# Patient Record
Sex: MALE | Race: WHITE
[De-identification: names, ages, dates, MRNs, and addresses within clinical notes are randomized per-mention and may not be internally consistent; named-entity substitution may affect disease eponyms.]

---

## 2017-01-10 ENCOUNTER — HOSPITAL ENCOUNTER (OUTPATIENT)
Dept: HOSPITAL 61 - PCVCCLINIC | Age: 67
Discharge: HOME | End: 2017-01-10
Attending: INTERNAL MEDICINE
Payer: MEDICARE

## 2017-01-10 DIAGNOSIS — K21.9: ICD-10-CM

## 2017-01-10 DIAGNOSIS — I48.91: Primary | ICD-10-CM

## 2017-01-10 DIAGNOSIS — I10: ICD-10-CM

## 2017-01-10 DIAGNOSIS — E78.2: ICD-10-CM

## 2017-01-10 PROCEDURE — 93005 ELECTROCARDIOGRAM TRACING: CPT

## 2017-01-10 PROCEDURE — G0463 HOSPITAL OUTPT CLINIC VISIT: HCPCS

## 2017-10-10 ENCOUNTER — HOSPITAL ENCOUNTER (OUTPATIENT)
Dept: HOSPITAL 61 - PCVCCLINIC | Age: 67
Discharge: HOME | End: 2017-10-10
Attending: INTERNAL MEDICINE
Payer: MEDICARE

## 2017-10-10 DIAGNOSIS — K21.9: ICD-10-CM

## 2017-10-10 DIAGNOSIS — E78.5: ICD-10-CM

## 2017-10-10 DIAGNOSIS — I10: ICD-10-CM

## 2017-10-10 DIAGNOSIS — I48.0: Primary | ICD-10-CM

## 2017-10-10 DIAGNOSIS — F17.200: ICD-10-CM

## 2017-10-10 DIAGNOSIS — Z79.82: ICD-10-CM

## 2017-10-10 PROCEDURE — 93005 ELECTROCARDIOGRAM TRACING: CPT

## 2017-10-10 PROCEDURE — G0463 HOSPITAL OUTPT CLINIC VISIT: HCPCS

## 2018-04-13 ENCOUNTER — HOSPITAL ENCOUNTER (OUTPATIENT)
Dept: HOSPITAL 61 - PCVCIMAG | Age: 68
Discharge: HOME | End: 2018-04-13
Attending: INTERNAL MEDICINE
Payer: MEDICARE

## 2018-04-13 DIAGNOSIS — F17.200: ICD-10-CM

## 2018-04-13 DIAGNOSIS — I10: ICD-10-CM

## 2018-04-13 DIAGNOSIS — Z79.82: ICD-10-CM

## 2018-04-13 DIAGNOSIS — Z79.899: ICD-10-CM

## 2018-04-13 DIAGNOSIS — I48.91: Primary | ICD-10-CM

## 2018-04-13 DIAGNOSIS — K21.9: ICD-10-CM

## 2018-04-13 DIAGNOSIS — R60.9: ICD-10-CM

## 2018-04-13 DIAGNOSIS — R94.31: ICD-10-CM

## 2018-04-13 PROCEDURE — 93005 ELECTROCARDIOGRAM TRACING: CPT

## 2018-04-13 PROCEDURE — 93306 TTE W/DOPPLER COMPLETE: CPT

## 2018-05-18 ENCOUNTER — HOSPITAL ENCOUNTER (OUTPATIENT)
Dept: HOSPITAL 61 - PCVCINTER | Age: 68
Discharge: HOME | End: 2018-05-18
Attending: INTERNAL MEDICINE
Payer: MEDICARE

## 2018-05-18 DIAGNOSIS — I48.91: ICD-10-CM

## 2018-05-18 DIAGNOSIS — I08.1: Primary | ICD-10-CM

## 2018-05-18 PROCEDURE — 93325 DOPPLER ECHO COLOR FLOW MAPG: CPT

## 2018-05-18 PROCEDURE — 93312 ECHO TRANSESOPHAGEAL: CPT

## 2018-05-21 ENCOUNTER — HOSPITAL ENCOUNTER (OUTPATIENT)
Dept: HOSPITAL 35 - CAT | Age: 68
End: 2018-05-21
Attending: INTERNAL MEDICINE
Payer: COMMERCIAL

## 2018-05-21 DIAGNOSIS — I25.10: Primary | ICD-10-CM

## 2018-05-21 DIAGNOSIS — M47.894: ICD-10-CM

## 2018-05-21 LAB
ALBUMIN SERPL-MCNC: 4.2 G/DL (ref 3.4–5)
ALT SERPL-CCNC: 35 U/L (ref 30–65)
ANION GAP SERPL CALC-SCNC: 9 MMOL/L (ref 7–16)
AST SERPL-CCNC: 19 U/L (ref 15–37)
BILIRUB SERPL-MCNC: 0.7 MG/DL
BUN SERPL-MCNC: 19 MG/DL (ref 7–18)
CALCIUM SERPL-MCNC: 8.8 MG/DL (ref 8.5–10.1)
CHLORIDE SERPL-SCNC: 110 MMOL/L (ref 98–107)
CO2 SERPL-SCNC: 25 MMOL/L (ref 21–32)
CREAT SERPL-MCNC: 1.1 MG/DL (ref 0.7–1.3)
ERYTHROCYTE [DISTWIDTH] IN BLOOD BY AUTOMATED COUNT: 13.9 % (ref 10.5–14.5)
GLUCOSE SERPL-MCNC: 106 MG/DL (ref 74–106)
HCT VFR BLD CALC: 48.8 % (ref 42–52)
HGB BLD-MCNC: 16.7 GM/DL (ref 14–18)
MCH RBC QN AUTO: 30.2 PG (ref 26–34)
MCHC RBC AUTO-ENTMCNC: 34.1 G/DL (ref 28–37)
MCV RBC: 88.3 FL (ref 80–100)
PLATELET # BLD: 178 THOU/UL (ref 150–400)
POTASSIUM SERPL-SCNC: 3.5 MMOL/L (ref 3.5–5.1)
PROT SERPL-MCNC: 6.5 G/DL (ref 6.4–8.2)
RBC # BLD AUTO: 5.53 MIL/UL (ref 4.5–6)
SODIUM SERPL-SCNC: 144 MMOL/L (ref 136–145)
WBC # BLD AUTO: 5.9 THOU/UL (ref 4–11)

## 2018-06-28 ENCOUNTER — HOSPITAL ENCOUNTER (OUTPATIENT)
Dept: HOSPITAL 61 - PCVCINTER | Age: 68
End: 2018-06-28
Attending: INTERNAL MEDICINE
Payer: MEDICARE

## 2018-06-28 DIAGNOSIS — I48.91: ICD-10-CM

## 2018-06-28 DIAGNOSIS — I34.0: Primary | ICD-10-CM

## 2018-06-28 DIAGNOSIS — I10: ICD-10-CM

## 2018-06-28 PROCEDURE — 93312 ECHO TRANSESOPHAGEAL: CPT

## 2018-07-16 ENCOUNTER — HOSPITAL ENCOUNTER (OUTPATIENT)
Dept: HOSPITAL 35 - CATH | Age: 68
Setting detail: OBSERVATION
LOS: 1 days | Discharge: HOME | End: 2018-07-17
Attending: INTERNAL MEDICINE | Admitting: INTERNAL MEDICINE
Payer: COMMERCIAL

## 2018-07-16 VITALS — SYSTOLIC BLOOD PRESSURE: 146 MMHG | DIASTOLIC BLOOD PRESSURE: 92 MMHG

## 2018-07-16 VITALS — BODY MASS INDEX: 35.79 KG/M2 | HEIGHT: 70 IN | WEIGHT: 250 LBS

## 2018-07-16 VITALS — SYSTOLIC BLOOD PRESSURE: 134 MMHG | DIASTOLIC BLOOD PRESSURE: 92 MMHG

## 2018-07-16 VITALS — DIASTOLIC BLOOD PRESSURE: 84 MMHG | SYSTOLIC BLOOD PRESSURE: 144 MMHG

## 2018-07-16 VITALS — DIASTOLIC BLOOD PRESSURE: 88 MMHG | SYSTOLIC BLOOD PRESSURE: 125 MMHG

## 2018-07-16 VITALS — DIASTOLIC BLOOD PRESSURE: 95 MMHG | SYSTOLIC BLOOD PRESSURE: 151 MMHG

## 2018-07-16 DIAGNOSIS — I48.91: Primary | ICD-10-CM

## 2018-07-16 DIAGNOSIS — F17.210: ICD-10-CM

## 2018-07-16 DIAGNOSIS — Z72.89: ICD-10-CM

## 2018-07-16 LAB
ANION GAP SERPL CALC-SCNC: 6 MMOL/L (ref 7–16)
APTT BLD: 25.9 SECONDS (ref 24.5–32.8)
BASOPHILS NFR BLD AUTO: 0.9 % (ref 0–2)
BUN SERPL-MCNC: 15 MG/DL (ref 7–18)
CALCIUM SERPL-MCNC: 9 MG/DL (ref 8.5–10.1)
CHLORIDE SERPL-SCNC: 107 MMOL/L (ref 98–107)
CO2 SERPL-SCNC: 28 MMOL/L (ref 21–32)
CREAT SERPL-MCNC: 1.1 MG/DL (ref 0.7–1.3)
EOSINOPHIL NFR BLD: 2.1 % (ref 0–3)
ERYTHROCYTE [DISTWIDTH] IN BLOOD BY AUTOMATED COUNT: 14.3 % (ref 10.5–14.5)
GLUCOSE SERPL-MCNC: 98 MG/DL (ref 74–106)
GRANULOCYTES NFR BLD MANUAL: 64.8 % (ref 36–66)
HCT VFR BLD CALC: 45.4 % (ref 42–52)
HGB BLD-MCNC: 15.6 GM/DL (ref 14–18)
INR PPP: 1.1
LYMPHOCYTES NFR BLD AUTO: 24.6 % (ref 24–44)
MCH RBC QN AUTO: 29.8 PG (ref 26–34)
MCHC RBC AUTO-ENTMCNC: 34.4 G/DL (ref 28–37)
MCV RBC: 86.6 FL (ref 80–100)
MONOCYTES NFR BLD: 7.6 % (ref 1–8)
NEUTROPHILS # BLD: 3.7 THOU/UL (ref 1.4–8.2)
PLATELET # BLD: 163 THOU/UL (ref 150–400)
POTASSIUM SERPL-SCNC: 3.6 MMOL/L (ref 3.5–5.1)
PROTHROMBIN TIME: 11.3 SECONDS (ref 9.3–11.4)
RBC # BLD AUTO: 5.24 MIL/UL (ref 4.5–6)
SODIUM SERPL-SCNC: 141 MMOL/L (ref 136–145)
WBC # BLD AUTO: 5.7 THOU/UL (ref 4–11)

## 2018-07-16 PROCEDURE — 62900: CPT

## 2018-07-16 PROCEDURE — 65040: CPT

## 2018-07-16 PROCEDURE — 70005: CPT

## 2018-07-16 PROCEDURE — 62110: CPT

## 2018-07-16 PROCEDURE — 65043: CPT

## 2018-07-16 PROCEDURE — 65020: CPT

## 2018-07-17 VITALS — DIASTOLIC BLOOD PRESSURE: 89 MMHG | SYSTOLIC BLOOD PRESSURE: 152 MMHG

## 2018-07-17 VITALS — DIASTOLIC BLOOD PRESSURE: 98 MMHG | SYSTOLIC BLOOD PRESSURE: 145 MMHG

## 2018-09-22 ENCOUNTER — HOSPITAL ENCOUNTER (OUTPATIENT)
Dept: HOSPITAL 35 - SLEEPLAB | Age: 68
End: 2018-09-22
Attending: INTERNAL MEDICINE
Payer: COMMERCIAL

## 2018-09-22 DIAGNOSIS — I48.91: ICD-10-CM

## 2018-09-22 DIAGNOSIS — E78.5: ICD-10-CM

## 2018-09-22 DIAGNOSIS — G47.10: Primary | ICD-10-CM

## 2018-09-22 DIAGNOSIS — K21.9: ICD-10-CM

## 2018-09-22 DIAGNOSIS — I10: ICD-10-CM

## 2018-10-04 ENCOUNTER — HOSPITAL ENCOUNTER (OUTPATIENT)
Dept: HOSPITAL 35 - CATH | Age: 68
Discharge: HOME | End: 2018-10-04
Attending: INTERNAL MEDICINE
Payer: COMMERCIAL

## 2018-10-04 DIAGNOSIS — Z53.8: ICD-10-CM

## 2018-10-04 DIAGNOSIS — I48.91: Primary | ICD-10-CM

## 2019-08-20 ENCOUNTER — HOSPITAL ENCOUNTER (OUTPATIENT)
Dept: HOSPITAL 35 - CATH | Age: 69
Discharge: HOME | End: 2019-08-20
Attending: INTERNAL MEDICINE
Payer: COMMERCIAL

## 2019-08-20 VITALS — SYSTOLIC BLOOD PRESSURE: 131 MMHG | DIASTOLIC BLOOD PRESSURE: 81 MMHG

## 2019-08-20 VITALS — DIASTOLIC BLOOD PRESSURE: 81 MMHG | SYSTOLIC BLOOD PRESSURE: 131 MMHG

## 2019-08-20 DIAGNOSIS — Z79.899: ICD-10-CM

## 2019-08-20 DIAGNOSIS — R00.2: ICD-10-CM

## 2019-08-20 DIAGNOSIS — Z79.82: ICD-10-CM

## 2019-08-20 DIAGNOSIS — Z98.890: ICD-10-CM

## 2019-08-20 DIAGNOSIS — Z79.01: ICD-10-CM

## 2019-08-20 DIAGNOSIS — I48.91: Primary | ICD-10-CM

## 2019-12-05 ENCOUNTER — HOSPITAL ENCOUNTER (OUTPATIENT)
Dept: HOSPITAL 35 - CV | Age: 69
End: 2019-12-05
Attending: INTERNAL MEDICINE
Payer: COMMERCIAL

## 2019-12-05 DIAGNOSIS — I48.91: ICD-10-CM

## 2019-12-05 DIAGNOSIS — E78.00: ICD-10-CM

## 2019-12-05 DIAGNOSIS — I25.10: ICD-10-CM

## 2019-12-05 DIAGNOSIS — Z13.6: Primary | ICD-10-CM

## 2019-12-05 NOTE — EXE
[facility]
[address]
[place]  [address]
Phone:  [phone]                    STRESS ECHOCARDIOGRAM         
_______________________________________________________________________________
 
Name:            ENMA KELLEY SHO              Room #:                    REG [facility]#:           [id number]          Account #:     [id number]  
Admission:       12/05/19         Attend Phys:   Paramjit Schmidt MD   
Discharge:                  Date of Birth: [birth year]  
                         Report #:      [id number]
        [id number]
_______________________________________________________________________________
THIS REPORT FOR:   //name//                          
 
 
--------------- APPROVED REPORT --------------
 
 
Study performed:  12/05/2019 10:00:20
 
Exam:  Stress Echocardiogram
Indication: Afib
Patient Location: Out-Patient
Stress Nurse: Silke Montoya RN
Status: routine
 
Ht: 5 ft 10 in     
HR: 63 bpm       BP: 134/90 mmHg 
Rhythm: NSR    
 
Medical History
Medical History: Cardioversion and ablation
Medications: Listed on worksheet
Allergies: No known drug allergies
Cardiac Risk Factors: HTN, Hyperlipidemia, obesity.
 
Procedure
The patient underwent an Exercise Stress Test using the Prem 
Protocol. Blood pressure, heart rate, and EKG were monitored.
An Echocardiogram was performed by technician in four stages in quad 
fashion.  At peak stress, four selected images were obtained and 
placed side by side with resting images for comparison.
 
Stress Test Details
Stress Test:  Exercise stress testing was performed using a Prem 
protocol.      
HR           
Resting HR:            63 bpm   Max Heart Rate (APMHR): 152 bpm  
Max HR Achieved:  134 bpm   Target HR (85% APMHR): 129 bpm  
% of APMHR:         88         
Recovery HR:            82 bpm        
HR response to stress: Normal HR response to stress      
 
BP           
Resting BP:  134/90 mmHg        
Max BP:       168/94 mmHg        
Recovery BP:       140/82 mmHg        
BP response to stress: Normal blood pressure response to 
 
 
[facility]
[address]
[place]  [address]
Phone:  [phone]                    STRESS ECHOCARDIOGRAM         
_______________________________________________________________________________
 
Name:            WARRENENMA SHO              [id number]          Account #:     [id number]  
Admission:       12/05/19         Attend Phys:   Paramjit Schmidt MD   
Discharge:                  Date of Birth: [birth year]  
                         Report #:      [id number]
        [id number]
_______________________________________________________________________________
stress.      
ECG           
Resting ECG:  Sinus Rhythm        
Stress ECG:     Sinus Rhythm, nonspecific ST-T abnormalities      
ST Change: Non-ischemic         
 
Clinical
Reason for Termination: Dyspnea
Stress Symptoms: Dyspnea
Exercise duration: 8 min 29 sec
Highest Stage Achieved: 3 
Exercise capacity: 10.40 METs
 
Pre-Stress Echo
The resting Echocardiogram showed normal left ventricular 
contractility with an estimated Ejection Fraction of about 55%. 
The resting echocardiogram demonstrated normal wall motion in all 
wall segments.  
Normal wall motion in all segments on baseline images.
 
Post-Stress Echo
The stress Echocardiogram showed normal left ventricular 
contractility with an estimated Ejection Fraction of about 65-70%. 
Compared to rest, there were no stress-induced wall motion 
abnormalities. 
Normal augmentation of wall motion in all segments on post stress 
images. 
 
Clinical
No clinical or ECG evidence for ischemia. 
 
Conclusion
Clinical Response:  Non-ischemic
Exercise Capacity:  Average
Stress ECG Response:  Non-ischemic
Stress Echo Images:  Non-ischemic
The left ventricle is normal in size and wall thickness in both the 
rest and stress images.
 
Other Information
Study Quality: Adequate
 
<Conclusion>
 
 
[facility]
[address]
[place]  [address]
Phone:  [phone]                    STRESS ECHOCARDIOGRAM         
_______________________________________________________________________________
 
Name:            WARRENENMA SHO              Room #:                    REG [facility].#:           [id number]          Account #:     [id number]  
Admission:       12/05/19         Attend Phys:   Paramjit Schmidt MD   
Discharge:                  Date of Birth: [birth year]  
                         Report #:      [id number]
        [id number]
_______________________________________________________________________________
The left ventricle is normal in size and wall thickness in both the 
rest and stress images.
 
 
 
 
 
 
 
 
 
 
 
 
 
 
 
 
 
 
 
 
 
 
 
 
 
 
 
 
 
 
 
 
 
 
 
 
 
 
 
 
 
 
  <ELECTRONICALLY SIGNED>
   By: Paramjit Schmidt MD               
  12/05/19     1102
D: 12/05/19 1102                           _____________________________________
T: 12/05/19 1102                           Paramjit Schmidt MD                 /INF

## 2020-02-04 ENCOUNTER — HOSPITAL ENCOUNTER (OUTPATIENT)
Dept: HOSPITAL 35 - SJCVC | Age: 70
End: 2020-02-04
Attending: INTERNAL MEDICINE
Payer: COMMERCIAL

## 2020-02-04 DIAGNOSIS — I48.0: ICD-10-CM

## 2020-02-04 DIAGNOSIS — K21.9: ICD-10-CM

## 2020-02-04 DIAGNOSIS — I10: ICD-10-CM

## 2020-02-04 DIAGNOSIS — E78.5: ICD-10-CM

## 2020-02-04 DIAGNOSIS — I21.19: Primary | ICD-10-CM

## 2020-02-04 DIAGNOSIS — Z79.899: ICD-10-CM

## 2020-02-04 DIAGNOSIS — R94.31: ICD-10-CM

## 2020-02-04 DIAGNOSIS — Z87.891: ICD-10-CM

## 2020-06-04 ENCOUNTER — HOSPITAL ENCOUNTER (OUTPATIENT)
Dept: HOSPITAL 35 - SJCVC | Age: 70
End: 2020-06-04
Attending: INTERNAL MEDICINE
Payer: COMMERCIAL

## 2020-06-04 DIAGNOSIS — R94.31: Primary | ICD-10-CM

## 2020-06-04 DIAGNOSIS — I10: ICD-10-CM

## 2020-06-04 DIAGNOSIS — R60.9: ICD-10-CM

## 2020-06-04 DIAGNOSIS — I48.19: ICD-10-CM

## 2020-06-04 DIAGNOSIS — E78.00: ICD-10-CM

## 2020-12-08 ENCOUNTER — HOSPITAL ENCOUNTER (OUTPATIENT)
Dept: HOSPITAL 35 - SJCVCIMAG | Age: 70
End: 2020-12-08
Attending: INTERNAL MEDICINE
Payer: COMMERCIAL

## 2020-12-08 DIAGNOSIS — I48.91: ICD-10-CM

## 2020-12-08 DIAGNOSIS — Z79.899: ICD-10-CM

## 2020-12-08 DIAGNOSIS — I10: ICD-10-CM

## 2020-12-08 DIAGNOSIS — I25.10: ICD-10-CM

## 2020-12-08 DIAGNOSIS — Z87.891: ICD-10-CM

## 2020-12-08 DIAGNOSIS — E78.00: ICD-10-CM

## 2020-12-08 DIAGNOSIS — R94.31: Primary | ICD-10-CM

## 2020-12-29 ENCOUNTER — HOSPITAL ENCOUNTER (OUTPATIENT)
Dept: HOSPITAL 35 - SJCVC | Age: 70
End: 2020-12-29
Attending: INTERNAL MEDICINE
Payer: COMMERCIAL

## 2020-12-29 DIAGNOSIS — I48.91: ICD-10-CM

## 2020-12-29 DIAGNOSIS — E78.5: ICD-10-CM

## 2020-12-29 DIAGNOSIS — K21.9: ICD-10-CM

## 2020-12-29 DIAGNOSIS — E78.00: Primary | ICD-10-CM

## 2020-12-29 DIAGNOSIS — I10: ICD-10-CM

## 2021-02-17 ENCOUNTER — HOSPITAL ENCOUNTER (OUTPATIENT)
Dept: HOSPITAL 35 - SJCVC | Age: 71
End: 2021-02-17
Attending: INTERNAL MEDICINE
Payer: COMMERCIAL

## 2021-02-17 DIAGNOSIS — Z82.49: ICD-10-CM

## 2021-02-17 DIAGNOSIS — R94.31: Primary | ICD-10-CM

## 2021-02-17 DIAGNOSIS — E78.5: ICD-10-CM

## 2021-02-17 DIAGNOSIS — N18.9: ICD-10-CM

## 2021-02-17 DIAGNOSIS — Z98.890: ICD-10-CM

## 2021-02-17 DIAGNOSIS — Z79.899: ICD-10-CM

## 2021-02-17 DIAGNOSIS — K21.9: ICD-10-CM

## 2021-02-17 DIAGNOSIS — I48.0: ICD-10-CM

## 2021-02-17 DIAGNOSIS — I12.9: ICD-10-CM

## 2021-02-17 DIAGNOSIS — N40.0: ICD-10-CM

## 2021-02-17 DIAGNOSIS — Z87.891: ICD-10-CM

## 2021-02-17 DIAGNOSIS — Z88.8: ICD-10-CM

## 2021-06-15 ENCOUNTER — APPOINTMENT (RX ONLY)
Dept: URBAN - METROPOLITAN AREA CLINIC 76 | Facility: CLINIC | Age: 71
Setting detail: DERMATOLOGY
End: 2021-06-15

## 2021-06-15 DIAGNOSIS — L259 CONTACT DERMATITIS AND OTHER ECZEMA, UNSPECIFIED CAUSE: ICD-10-CM

## 2021-06-15 DIAGNOSIS — L71.1 RHINOPHYMA: ICD-10-CM

## 2021-06-15 PROBLEM — L23.9 ALLERGIC CONTACT DERMATITIS, UNSPECIFIED CAUSE: Status: ACTIVE | Noted: 2021-06-15

## 2021-06-15 PROCEDURE — ? PRESCRIPTION

## 2021-06-15 PROCEDURE — ? COUNSELING

## 2021-06-15 PROCEDURE — 99203 OFFICE O/P NEW LOW 30 MIN: CPT

## 2021-06-16 RX ORDER — TRIAMCINOLONE ACETONIDE 1 MG/G
CREAM TOPICAL
Qty: 1 | Refills: 0 | Status: ERX | COMMUNITY
Start: 2021-06-16

## 2021-06-16 RX ORDER — PREDNISONE 10 MG/1
TABLET ORAL
Qty: 32 | Refills: 0 | Status: ERX | COMMUNITY
Start: 2021-06-16

## 2021-06-16 RX ADMIN — PREDNISONE: 10 TABLET ORAL at 00:00

## 2021-06-16 RX ADMIN — TRIAMCINOLONE ACETONIDE: 1 CREAM TOPICAL at 00:00

## 2021-06-30 ENCOUNTER — HOSPITAL ENCOUNTER (OUTPATIENT)
Dept: HOSPITAL 35 - SJCVC | Age: 71
End: 2021-06-30
Attending: INTERNAL MEDICINE
Payer: COMMERCIAL

## 2021-06-30 DIAGNOSIS — R60.9: ICD-10-CM

## 2021-06-30 DIAGNOSIS — R94.31: Primary | ICD-10-CM

## 2021-06-30 DIAGNOSIS — Z82.49: ICD-10-CM

## 2021-06-30 DIAGNOSIS — I12.9: ICD-10-CM

## 2021-06-30 DIAGNOSIS — E78.00: ICD-10-CM

## 2021-06-30 DIAGNOSIS — E78.5: ICD-10-CM

## 2021-06-30 DIAGNOSIS — I25.10: ICD-10-CM

## 2021-06-30 DIAGNOSIS — I48.0: ICD-10-CM

## 2021-06-30 DIAGNOSIS — K21.9: ICD-10-CM

## 2021-06-30 DIAGNOSIS — N18.9: ICD-10-CM

## 2021-06-30 DIAGNOSIS — Z87.891: ICD-10-CM

## 2021-06-30 DIAGNOSIS — Z98.890: ICD-10-CM

## 2021-06-30 DIAGNOSIS — N40.0: ICD-10-CM

## 2021-06-30 DIAGNOSIS — I49.49: ICD-10-CM

## 2021-06-30 DIAGNOSIS — Z79.899: ICD-10-CM

## 2021-12-07 ENCOUNTER — HOSPITAL ENCOUNTER (OUTPATIENT)
Dept: HOSPITAL 35 - SJCVC | Age: 71
End: 2021-12-07
Attending: INTERNAL MEDICINE
Payer: COMMERCIAL

## 2021-12-07 DIAGNOSIS — R94.31: Primary | ICD-10-CM

## 2021-12-07 DIAGNOSIS — E78.5: ICD-10-CM

## 2021-12-07 DIAGNOSIS — R06.00: ICD-10-CM

## 2021-12-07 DIAGNOSIS — Z88.8: ICD-10-CM

## 2021-12-07 DIAGNOSIS — I48.0: ICD-10-CM

## 2021-12-07 DIAGNOSIS — Z79.899: ICD-10-CM

## 2021-12-07 DIAGNOSIS — N18.9: ICD-10-CM

## 2021-12-07 DIAGNOSIS — E78.00: ICD-10-CM

## 2021-12-07 DIAGNOSIS — I49.1: ICD-10-CM

## 2021-12-07 DIAGNOSIS — I12.9: ICD-10-CM

## 2021-12-07 DIAGNOSIS — Z72.89: ICD-10-CM

## 2021-12-07 DIAGNOSIS — K21.9: ICD-10-CM

## 2021-12-07 DIAGNOSIS — Z87.891: ICD-10-CM

## 2021-12-28 ENCOUNTER — HOSPITAL ENCOUNTER (OUTPATIENT)
Dept: HOSPITAL 35 - CATH | Age: 71
Discharge: HOME | End: 2021-12-28
Attending: INTERNAL MEDICINE
Payer: COMMERCIAL

## 2021-12-28 VITALS — DIASTOLIC BLOOD PRESSURE: 72 MMHG | SYSTOLIC BLOOD PRESSURE: 112 MMHG

## 2021-12-28 DIAGNOSIS — I48.91: ICD-10-CM

## 2021-12-28 DIAGNOSIS — Z98.890: ICD-10-CM

## 2021-12-28 DIAGNOSIS — Z79.899: ICD-10-CM

## 2021-12-28 DIAGNOSIS — Z45.09: Primary | ICD-10-CM

## 2021-12-29 NOTE — P
[address] [place]   [address]                     PROCEDURE REPORT              
_______________________________________________________________________________
 
Name:       ENMA KELLEY              Room #:                     REG [facility]#:      [id number]                       Account #:      [id number]  
Admission:  12/28/21    Attend Phys:    Sunny Anderson MD
Discharge:              Date of Birth:  [birth year]  
                                                          Report #: [id number]
                                                                    [id number] 
_______________________________________________________________________________
THIS REPORT FOR:  
 
cc:  John Gandara MD, Herbert M. MD Couchonnal, Luis F. MD                                        ~
 
DATE OF SERVICE: 12/28/2021
 
IMPLANTABLE LOOP RECORDER REMOVAL AND INSERTION
 
PROCEDURES:
1.  Explantation of an implantable loop recorder.
2.  Implantation of a loop recorder.
 
DESCRIPTION OF PROCEDURE:  The patient underwent informed consent.  He was then 
prepped and draped in a standard fashion.  I injected lidocaine at the incision 
site.  An incision was made.  The device was extracted.  Once removed, the new 
device was inserted, tested and found to be functioning normally and a suture 
was placed and surgical glue was placed to outer skin layer.  A dressing was 
placed.  There were no procedure related complications.  The explanted device 
was a [employer] device.  The newly implanted device was a [employer] 
LINQ, model number [id number], serial number [id number].
 
CONCLUSION:  Successful implantation of a new loop recorder.
 
 
 
 
 
 
 
 
 
 
 
 
 
 
 
 
 
 
 
 
  <ELECTRONICALLY SIGNED>
   By: Sunny Anderson MD        
  12/29/21     1228
D: 12/28/21 1005                           _____________________________________
T: 12/28/21 1133                           MD dorita Shea

## 2022-02-15 ENCOUNTER — HOSPITAL ENCOUNTER (OUTPATIENT)
Dept: HOSPITAL 35 - SJCVC | Age: 72
End: 2022-02-15
Attending: INTERNAL MEDICINE
Payer: COMMERCIAL

## 2022-02-15 DIAGNOSIS — R93.1: ICD-10-CM

## 2022-02-15 DIAGNOSIS — Z87.891: ICD-10-CM

## 2022-02-15 DIAGNOSIS — E78.5: ICD-10-CM

## 2022-02-15 DIAGNOSIS — I48.0: Primary | ICD-10-CM

## 2022-02-15 DIAGNOSIS — Z98.890: ICD-10-CM

## 2022-02-15 DIAGNOSIS — R60.9: ICD-10-CM

## 2022-02-15 DIAGNOSIS — E78.00: ICD-10-CM

## 2022-02-15 DIAGNOSIS — I10: ICD-10-CM

## 2022-02-15 DIAGNOSIS — Z79.899: ICD-10-CM

## 2022-02-15 DIAGNOSIS — Z88.8: ICD-10-CM

## 2022-03-29 ENCOUNTER — APPOINTMENT (RX ONLY)
Dept: URBAN - METROPOLITAN AREA CLINIC 76 | Facility: CLINIC | Age: 72
Setting detail: DERMATOLOGY
End: 2022-03-29

## 2022-03-29 DIAGNOSIS — L71.1 RHINOPHYMA: ICD-10-CM

## 2022-03-29 PROCEDURE — 17999 UNLISTD PX SKN MUC MEMB SUBQ: CPT

## 2022-03-29 PROCEDURE — ? SCITON

## 2022-03-29 PROCEDURE — ? PRESCRIPTION

## 2022-03-29 RX ADMIN — LIDOCAINE: 50 OINTMENT TOPICAL at 00:00

## 2022-03-29 ASSESSMENT — LOCATION ZONE DERM: LOCATION ZONE: NOSE

## 2022-03-29 ASSESSMENT — LOCATION SIMPLE DESCRIPTION DERM: LOCATION SIMPLE: NOSE

## 2022-03-29 ASSESSMENT — LOCATION DETAILED DESCRIPTION DERM: LOCATION DETAILED: NASAL DORSUM

## 2022-03-29 NOTE — HPI: OTHER
Condition:: Rhinophyma
Please Describe Your Condition:: Bennie Erbium Yag laser treatment of nose disfiguration secondary to rosacea. \\n\\nPatient states that he has been off of his blood thinner for a little over a week.

## 2022-03-29 NOTE — PROCEDURE: SCITON
Ablation Depth (Microns): 100
Post-Care Instructions: I reviewed with the patient in detail post-care instructions. Patient should stay away from the sun and wear sun protection until treated areas are fully healed.
Fractional Density Range (%): 0
Detail Level: Zone
Treated Area: small area
Cooling ?: No
Prep Text: The patient's skin was prepped in the usual manner.\\n\\nInitial ablation was performed at 100 microns ablation, 50 microns coagulation. Additional secondary ablation and contouring was performed at 50 microns ablation and 50 microns coagulation. Throughout the procedure there was a lot surface bleeding and rapid reversal of anesthesia due to the requirement to use PLAIN lidocaine. Once proper contouring was achieved, feathering was performed at the peripheral ablative epidermal junctions at 10 microns ablation and 0 microns coagulation. Total mg/kg of lidocaine used = 3.8mg/kg. The skin was cleansed and followed by vaseline, spandgel, and gauze. Patient was provided approximately 15gm of 20% lidocaine ointment should he need it throughout the night. Patient to follow up in 24 hours. \\n\\n**ABN FORM SIGNED TODAY**\\nFee being submitted is $8454
Consent: Written consent obtained, risks reviewed including but not limited to crusting, scabbing, blistering, scarring, darker or lighter pigmentary change, bruising, and/or incomplete response.
Anesthesia Type: 1% lidocaine without epinephrine
Coagulation Depth (Microns): 50
Pulse Duration Units: milliseconds
Treatment Number: 1
Anesthesia Volume In Cc: 40

## 2022-03-30 ENCOUNTER — APPOINTMENT (RX ONLY)
Dept: URBAN - METROPOLITAN AREA CLINIC 76 | Facility: CLINIC | Age: 72
Setting detail: DERMATOLOGY
End: 2022-03-30

## 2022-03-30 DIAGNOSIS — Z48.817 ENCOUNTER FOR SURGICAL AFTERCARE FOLLOWING SURGERY ON THE SKIN AND SUBCUTANEOUS TISSUE: ICD-10-CM

## 2022-03-30 PROCEDURE — 99024 POSTOP FOLLOW-UP VISIT: CPT

## 2022-03-30 PROCEDURE — ? POST-OP WOUND CHECK

## 2022-03-30 PROCEDURE — ? TREATMENT REGIMEN

## 2022-03-30 RX ORDER — LIDOCAINE 50 MG/G
OINTMENT TOPICAL
Qty: 30 | Refills: 1 | Status: ERX | COMMUNITY
Start: 2022-03-29

## 2022-03-30 ASSESSMENT — LOCATION SIMPLE DESCRIPTION DERM: LOCATION SIMPLE: NOSE

## 2022-03-30 ASSESSMENT — LOCATION ZONE DERM: LOCATION ZONE: NOSE

## 2022-03-30 ASSESSMENT — LOCATION DETAILED DESCRIPTION DERM: LOCATION DETAILED: NASAL SUPRATIP

## 2022-03-30 NOTE — PROCEDURE: POST-OP WOUND CHECK
Detail Level: Zone
Add 86968 Cpt? (Important Note: In 2017 The Use Of 57324 Is Being Tracked By Cms To Determine Future Global Period Reimbursement For Global Periods): yes
Wound Dressing Override (Optional): Vaseline & Spandgel
Wound Evaluated By: Dr. Guo
Additional Comments: wound was soaked today and debrided using CTAs.

## 2022-03-30 NOTE — PROCEDURE: TREATMENT REGIMEN
Plan: 1. Patient to follow up next Thursday
Detail Level: Zone
Initiate Treatment: 1. Starting soaking the area with 50/50 mixture of Hydrogen peroxide and water and then clean the wound using a gentle rolling motion of a q-tip - do this once daily
Continue Regimen: 1. Vaseline & Spandgel twice daily

## 2022-04-07 ENCOUNTER — APPOINTMENT (RX ONLY)
Dept: URBAN - METROPOLITAN AREA CLINIC 76 | Facility: CLINIC | Age: 72
Setting detail: DERMATOLOGY
End: 2022-04-07

## 2022-04-07 DIAGNOSIS — Z48.817 ENCOUNTER FOR SURGICAL AFTERCARE FOLLOWING SURGERY ON THE SKIN AND SUBCUTANEOUS TISSUE: ICD-10-CM

## 2022-04-07 PROCEDURE — ? POST-OP WOUND CHECK

## 2022-04-07 PROCEDURE — 99024 POSTOP FOLLOW-UP VISIT: CPT

## 2022-04-07 PROCEDURE — ? TREATMENT REGIMEN

## 2022-04-07 ASSESSMENT — LOCATION ZONE DERM: LOCATION ZONE: NOSE

## 2022-04-07 ASSESSMENT — LOCATION SIMPLE DESCRIPTION DERM: LOCATION SIMPLE: NOSE

## 2022-04-07 ASSESSMENT — LOCATION DETAILED DESCRIPTION DERM: LOCATION DETAILED: NASAL SUPRATIP

## 2022-04-07 NOTE — PROCEDURE: POST-OP WOUND CHECK
Detail Level: Zone
Add 38019 Cpt? (Important Note: In 2017 The Use Of 45072 Is Being Tracked By Cms To Determine Future Global Period Reimbursement For Global Periods): yes
Wound Dressing Override (Optional): Vaseline & Spandgel
Wound Evaluated By: Dr. Guo
Additional Comments: Patient's wound was about 30% re-epithelialized for his post operative stage. On exam today, the wound has significant improvement in the contour irregularity.

## 2022-04-07 NOTE — PROCEDURE: TREATMENT REGIMEN
Plan: Patient to return in a week for another post operative wound check
Detail Level: Zone
Continue Regimen: 1. Starting soaking the area with 50/50 mixture of Hydrogen peroxide and water and then clean the wound using a gentle rolling motion of a q-tip - do this once daily\\n2. Vaseline & Spandgel twice daily

## 2022-04-15 ENCOUNTER — APPOINTMENT (RX ONLY)
Dept: URBAN - METROPOLITAN AREA CLINIC 76 | Facility: CLINIC | Age: 72
Setting detail: DERMATOLOGY
End: 2022-04-15

## 2022-04-15 DIAGNOSIS — Z48.817 ENCOUNTER FOR SURGICAL AFTERCARE FOLLOWING SURGERY ON THE SKIN AND SUBCUTANEOUS TISSUE: ICD-10-CM

## 2022-04-15 PROCEDURE — ? POST-OP WOUND CHECK

## 2022-04-15 PROCEDURE — ? PRESCRIPTION

## 2022-04-15 RX ORDER — MINOCYCLINE HYDROCHLORIDE 100 MG/1
TABLET ORAL
Qty: 28 | Refills: 0 | Status: ERX | COMMUNITY
Start: 2022-04-15

## 2022-04-15 RX ADMIN — MINOCYCLINE HYDROCHLORIDE: 100 TABLET ORAL at 00:00

## 2022-04-15 ASSESSMENT — LOCATION ZONE DERM: LOCATION ZONE: NOSE

## 2022-04-15 ASSESSMENT — LOCATION SIMPLE DESCRIPTION DERM: LOCATION SIMPLE: NOSE

## 2022-04-15 ASSESSMENT — LOCATION DETAILED DESCRIPTION DERM: LOCATION DETAILED: NASAL SUPRATIP

## 2022-04-15 NOTE — PROCEDURE: POST-OP WOUND CHECK
Detail Level: Detailed
Add 53704 Cpt? (Important Note: In 2017 The Use Of 91970 Is Being Tracked By Cms To Determine Future Global Period Reimbursement For Global Periods): no

## 2022-05-27 ENCOUNTER — APPOINTMENT (RX ONLY)
Dept: URBAN - METROPOLITAN AREA CLINIC 76 | Facility: CLINIC | Age: 72
Setting detail: DERMATOLOGY
End: 2022-05-27

## 2022-05-27 DIAGNOSIS — Z48.817 ENCOUNTER FOR SURGICAL AFTERCARE FOLLOWING SURGERY ON THE SKIN AND SUBCUTANEOUS TISSUE: ICD-10-CM

## 2022-05-27 PROCEDURE — 11900 INJECT SKIN LESIONS </W 7: CPT | Mod: 79

## 2022-05-27 PROCEDURE — 99024 POSTOP FOLLOW-UP VISIT: CPT

## 2022-05-27 PROCEDURE — ? INJECTION

## 2022-05-27 PROCEDURE — ? REFERRAL CORRESPONDENCE

## 2022-05-27 PROCEDURE — ? POST-OP WOUND CHECK

## 2022-05-27 ASSESSMENT — LOCATION DETAILED DESCRIPTION DERM: LOCATION DETAILED: NASAL SUPRATIP

## 2022-05-27 ASSESSMENT — LOCATION ZONE DERM: LOCATION ZONE: NOSE

## 2022-05-27 ASSESSMENT — LOCATION SIMPLE DESCRIPTION DERM: LOCATION SIMPLE: NOSE

## 2022-05-27 NOTE — PROCEDURE: INJECTION
Units: cc
Detail Level: None
Total Volume Injected In Cc (Will Not Affected Billing): 0.1
Bill J-Code: no
Post-Care Instructions: I reviewed with the patient in detail post-care instructions. Patient understands to keep the injection sites clean and call the clinic if there is any redness, swelling or pain.
Additional Comments: **NO CHARGE FOR TODAY**
Route: IL
Medication (1) And Associated J-Code Units: Betamethasone Acetate, 3mg
Procedure Information: Please note that the numeric value listed in the Medication (1) and associated J-code units and Medication (2) and associated J-code units variables are j-code amounts and do not represent either the concentration or the total amount of the medications injected.  I strongly recommend selecting no to the Render J-code information in note question. This will allow your note to be more clear. If you are billing j-codes with your injection codes you need to document the total amount of the medication injected. This amount should match the j-code units. For example, if you are injecting Triamcinolone 40mg as an intramuscular injection you would select 40 for the dose field and mg for the units. This would allow you to document  with 4 units (40mg = 10mg x 4). The total volume is not used to calculate j-codes only the amount of the medication administered.
Administered By (Optional): Dr. Guo
Consent: The risks of the medication were reviewed with the patient.
Dose Administered (Numbers Only - Mg, G, Mcg, Units, Cc): 0
Render J-Code Information In Note?: yes

## 2022-05-27 NOTE — PROCEDURE: POST-OP WOUND CHECK
Detail Level: Detailed
Add 36776 Cpt? (Important Note: In 2017 The Use Of 57273 Is Being Tracked By Cms To Determine Future Global Period Reimbursement For Global Periods): yes
Wound Evaluated By: Dr. Gerardo Guo
Additional Comments: Patient to follow up with Dr. Bello's office and does not need to follow up with our office going forward.